# Patient Record
Sex: FEMALE | Race: WHITE | NOT HISPANIC OR LATINO | Employment: FULL TIME | RURAL
[De-identification: names, ages, dates, MRNs, and addresses within clinical notes are randomized per-mention and may not be internally consistent; named-entity substitution may affect disease eponyms.]

---

## 2020-07-21 ENCOUNTER — OFFICE VISIT (OUTPATIENT)
Dept: OTOLARYNGOLOGY | Facility: CLINIC | Age: 35
End: 2020-07-21

## 2020-07-21 VITALS
TEMPERATURE: 98.3 F | BODY MASS INDEX: 41.28 KG/M2 | WEIGHT: 263 LBS | DIASTOLIC BLOOD PRESSURE: 82 MMHG | SYSTOLIC BLOOD PRESSURE: 124 MMHG | OXYGEN SATURATION: 99 % | HEIGHT: 67 IN

## 2020-07-21 DIAGNOSIS — R42 VERTIGO: ICD-10-CM

## 2020-07-21 DIAGNOSIS — J34.2 NASAL SEPTAL DEFORMITY: ICD-10-CM

## 2020-07-21 DIAGNOSIS — G47.9 SLEEP DISTURBANCE: Primary | ICD-10-CM

## 2020-07-21 PROCEDURE — 99203 OFFICE O/P NEW LOW 30 MIN: CPT | Performed by: OTOLARYNGOLOGY

## 2020-07-21 RX ORDER — CLOTRIMAZOLE AND BETAMETHASONE DIPROPIONATE 10; .64 MG/G; MG/G
CREAM TOPICAL
COMMUNITY

## 2020-07-21 RX ORDER — CELECOXIB 200 MG/1
CAPSULE ORAL EVERY 12 HOURS SCHEDULED
COMMUNITY

## 2020-07-21 RX ORDER — ALBUTEROL SULFATE 90 UG/1
AEROSOL, METERED RESPIRATORY (INHALATION)
COMMUNITY

## 2020-07-22 NOTE — PROGRESS NOTES
Subjective   Ihsan Delgado is a 34 y.o. female.     History of Present Illness   Patient reports multiple complaints including episodic vertigo.  She states that she had vertigo back in November 2019 that subsided but then recurred a little over a week and a half ago.  She says when she has this it is a sensation of being moved or pushed to the side.  It is not spinning.  Not associated with any subjective change in hearing.  She is asymptomatic today and has been for several days.  She also reports that she snores loudly on a nightly basis to the point that her  complains about this.  She feels like she has nonrestorative sleep.  Reportedly had a sleep study about 7 years ago that did not show sleep apnea.  Did have allergy symptoms including congestion and postnasal drainage but reports these improved significantly after taking allergy shots so she is not on any regular medicine for her nose.  No purulent rhinorrhea.        The following portions of the patient's history were reviewed and updated as appropriate: allergies, current medications, past family history, past medical history, past social history, past surgical history and problem list.      Ihsan Delgado reports that she has never smoked. She has never used smokeless tobacco. Alcohol use questions deferred to the physician. Drug use questions deferred to the physician.  Patient is not a tobacco user and has not been counseled for use of tobacco products    Family History   Problem Relation Age of Onset   • Diabetes Mother    • Heart failure Father    • Heart failure Maternal Grandmother    • Heart failure Maternal Grandfather    • Heart failure Paternal Grandmother    • Heart failure Paternal Grandfather        Allergies   Allergen Reactions   • Azithromycin Dizziness         Current Outpatient Medications:   •  albuterol sulfate HFA (ProAir HFA) 108 (90 Base) MCG/ACT inhaler, ProAir HFA 90 mcg/actuation aerosol inhaler  INHALE 2 PUFFS PO Q 4 H PRN  COUGHING/WHEEZING, Disp: , Rfl:   •  celecoxib (CeleBREX) 200 MG capsule, Every 12 (Twelve) Hours., Disp: , Rfl:   •  clotrimazole-betamethasone (LOTRISONE) 1-0.05 % cream, clotrimazole-betamethasone 1 %-0.05 % topical cream, Disp: , Rfl:     Past Medical History:   Diagnosis Date   • Chronic headaches    • Heart murmur    • MRSA cellulitis     MRSA in Neck       Past Surgical History:   Procedure Laterality Date   •  SECTION     • KNEE ACL RECONSTRUCTION     • KNEE SURGERY      screw removal         Review of Systems   HENT: Positive for congestion and postnasal drip.    Endocrine: Positive for cold intolerance.   Neurological: Positive for dizziness and headaches.   Psychiatric/Behavioral:        Not assessed   All other systems reviewed and are negative.          Objective   Physical Exam  General: Well-developed well-nourished female in no acute distress.  Alert and oriented x-3. Head: Normocephalic. Face: Symmetrical strength and appearance. PERRL. EOMI. Voice:Strong. Speech:Fluent  Ears: External ears no deformity, canals no discharge, tympanic membranes intact clear and mobile bilaterally.  Nose: Nares show no discharge mass polyp or purulence.  Boggy mucosa is present.  No gross external deformity.  Septum: To the left obstructing at least 80% of the airflow through the left naris.  Oral cavity: Lips and gums without lesions.  Tongue and floor of mouth without lesions.  Parotid and submandibular ducts unobstructed.  No mucosal lesions on the buccal mucosa or vestibule of the mouth.  Pharynx: No erythema exudate mass or ulcer.  2+ tonsils.  Neck: No lymphadenopathy.  No thyromegaly.  Trachea and larynx midline.  No masses in the parotid or submandibular glands.  [place]-Hallpike maneuvers produced no vertigo and no nystagmus    Assessment/Plan   Ihsan was seen today for nasal congestion.    Diagnoses and all orders for this visit:    Sleep disturbance  -     Polysomnography 4 or More  Parameters; Future    Nasal septal deformity    Vertigo      And: I explained to the patient that surgical treatment just directed at snoring is frequently unsuccessful.  I think before considering any kind of nasal or pharyngeal surgery she should have a repeat sleep study because if she does have obstructive sleep apnea syndrome CPAP would be indicated and that would address the issue with snoring.  She is agreeable and this will be scheduled.  Also explained that with her being asymptomatic and having a normal ear exam I could not diagnose her vertigo but encouraged her to call right away the next time she had a flareup and I would try to see her while symptomatic.  Otherwise I will see her after her sleep study.

## 2020-08-24 DIAGNOSIS — G47.9 SLEEP DISTURBANCE: Primary | ICD-10-CM

## 2020-08-31 ENCOUNTER — APPOINTMENT (OUTPATIENT)
Dept: SLEEP MEDICINE | Facility: HOSPITAL | Age: 35
End: 2020-08-31

## 2020-09-24 ENCOUNTER — APPOINTMENT (OUTPATIENT)
Dept: SLEEP MEDICINE | Facility: HOSPITAL | Age: 35
End: 2020-09-24

## 2020-10-27 ENCOUNTER — HOSPITAL ENCOUNTER (OUTPATIENT)
Dept: SLEEP MEDICINE | Facility: HOSPITAL | Age: 35
Discharge: HOME | End: 2020-10-27
Admitting: OTOLARYNGOLOGY

## 2020-10-27 DIAGNOSIS — G47.9 SLEEP DISTURBANCE: ICD-10-CM

## 2020-10-27 PROCEDURE — 95806 SLEEP STUDY UNATT&RESP EFFT: CPT

## 2020-10-27 PROCEDURE — 95806 SLEEP STUDY UNATT&RESP EFFT: CPT | Performed by: PSYCHIATRY & NEUROLOGY

## 2020-11-17 ENCOUNTER — OFFICE VISIT (OUTPATIENT)
Dept: OTOLARYNGOLOGY | Facility: CLINIC | Age: 35
End: 2020-11-17

## 2020-11-17 VITALS — HEIGHT: 67 IN | BODY MASS INDEX: 42.06 KG/M2 | OXYGEN SATURATION: 97 % | TEMPERATURE: 97.7 F | WEIGHT: 268 LBS

## 2020-11-17 DIAGNOSIS — G47.9 SLEEP DISTURBANCE: Primary | ICD-10-CM

## 2020-11-17 DIAGNOSIS — J34.2 NASAL SEPTAL DEFORMITY: ICD-10-CM

## 2020-11-17 DIAGNOSIS — G47.33 OBSTRUCTIVE SLEEP APNEA: ICD-10-CM

## 2020-11-17 PROCEDURE — 99213 OFFICE O/P EST LOW 20 MIN: CPT | Performed by: OTOLARYNGOLOGY

## 2020-11-17 RX ORDER — IBUPROFEN 800 MG/1
800 TABLET ORAL
COMMUNITY

## 2020-11-17 RX ORDER — LORATADINE 10 MG/1
10 TABLET ORAL DAILY
COMMUNITY

## 2020-11-19 NOTE — PROGRESS NOTES
Subjective   Ihsan Delgado is a 35 y.o. female.       History of Present Illness   Patient was seen previously with snoring, sleep disturbance, and nonrestorative sleep.  Also had a nasal septal deformity.  Has episodic vertigo that is atypical in presentation.  Reports that she had another episode of vertigo earlier this month but it resolved spontaneously.  Continues to have snoring and nonrestorative sleep.  Has undergone a sleep study which did show mild obstructive sleep apnea.      The following portions of the patient's history were reviewed and updated as appropriate: allergies, current medications, past family history, past medical history, past social history, past surgical history and problem list.     reports that she has never smoked. She has never used smokeless tobacco. Alcohol use questions deferred to the physician. Drug use questions deferred to the physician.   Patient is not a tobacco user and has not been counseled for use of tobacco products      Review of Systems   Constitutional: Negative for fever.           Objective   Physical Exam  General: Well-developed well-nourished female in no acute distress.  Alert and oriented x-3.  Voice:Strong. Speech:Fluent  Ears: External ears no deformity, canals no discharge, tympanic membranes intact clear and mobile bilaterally.  Nose: Nares show no discharge mass polyp or purulence.  Boggy mucosa is present.  No gross external deformity.  Septum: To the left  Oral cavity: Lips and gums without lesions.  Tongue and floor of mouth without lesions.  Parotid and submandibular ducts unobstructed.  No mucosal lesions on the buccal mucosa or vestibule of the mouth.  Pharynx: No erythema exudate mass or ulcer.  2+ tonsils present.  Neck: No lymphadenopathy.  No thyromegaly.  Trachea and larynx midline.  No masses in the parotid or submandibular glands.      Assessment/Plan   Diagnoses and all orders for this visit:    1. Sleep disturbance (Primary)  -      Ambulatory Referral to Sleep Medicine    2. Obstructive sleep apnea    3. Nasal septal deformity        Plan: Told the patient that I would recommend a trial of positive airway pressure.  If successful this should alleviate her snoring and improve her nonrestorative sleep.  Would reserve surgery for failure of Pap therapy.  She is referred to the sleep lab for CPAP fitting.  I want her to see me about 3 months after she has been using her CPAP.

## 2020-12-01 ENCOUNTER — DOCUMENTATION (OUTPATIENT)
Dept: OTOLARYNGOLOGY | Facility: CLINIC | Age: 35
End: 2020-12-01

## 2020-12-02 DIAGNOSIS — G47.9 SLEEP DISTURBANCE: Primary | ICD-10-CM

## 2021-02-08 ENCOUNTER — OFFICE VISIT (OUTPATIENT)
Dept: SLEEP MEDICINE | Facility: HOSPITAL | Age: 36
End: 2021-02-08

## 2021-02-08 VITALS
HEIGHT: 67 IN | WEIGHT: 253 LBS | OXYGEN SATURATION: 98 % | HEART RATE: 68 BPM | DIASTOLIC BLOOD PRESSURE: 74 MMHG | BODY MASS INDEX: 39.71 KG/M2 | SYSTOLIC BLOOD PRESSURE: 114 MMHG

## 2021-02-08 DIAGNOSIS — G47.33 OBSTRUCTIVE SLEEP APNEA, ADULT: Primary | ICD-10-CM

## 2021-02-08 PROCEDURE — 99203 OFFICE O/P NEW LOW 30 MIN: CPT | Performed by: NURSE PRACTITIONER

## 2021-02-08 NOTE — PROGRESS NOTES
New Patient Sleep Medicine Consultation    Encounter Date: 2021         Patient's Primary Care Provider: Porsha Calderon MD  Referring Provider: Ramiro Le*  Reason for consultation/chief complaint: KATE recently diagnosed prior to visit with sleep clinic, [facility]    Ihsan Delgado is a 35 y.o. female who admits to snoring, unrestful sleep, decreased libido, excessive daytime sleepiness, Disturbed or restless sleep, Up to the bathroom at night, sleepy driving, restless legs at night, difficulty falling asleep and difficulty staying asleep.     She denies cataplexy, sleep paralysis, or hypnagogic hallucinations. Her bedtime is ~ 2230. She  falls asleep after 30 minutes, and is up 1-2 times per night. She wakes up ~ 0630. She endorses 6-8 hours of sleep. She drinks 1 cups of coffee, 0-1 decaf teas, and 0 sodas per day. She drinks 0 alcoholic beverages per week. She is not a current smoker. She does not take sedatives or hypnotics. She has occasional sleepiness with driving. She rarely naps.    Patient states that she had an HST ordered per ENT, who she is following for chronic nasal congestion with nasal septal deformity. Patient states that she is potentially trying to avoid a tonsillectomy or any other surgery.    [place] - 14    Prior Sleep Testin. HST on 10/27/2020, AHI of 5.3       Past Medical History:   Diagnosis Date   • Chronic headaches    • Heart murmur    • MRSA cellulitis     MRSA in Neck     Social History     Socioeconomic History   • Marital status:      Spouse name: Not on file   • Number of children: Not on file   • Years of education: Not on file   • Highest education level: Not on file   Tobacco Use   • Smoking status: Never Smoker   • Smokeless tobacco: Never Used   Substance and Sexual Activity   • Alcohol use: Defer   • Drug use: Defer     Family History   Problem Relation Age of Onset   • Diabetes Mother    • Heart failure Father    • Heart  "failure Maternal Grandmother    • Heart failure Maternal Grandfather    • Heart failure Paternal Grandmother    • Heart failure Paternal Grandfather      Prior T&A, UPPP, maxillofacial, or bariatric surgery: None  Family history of sleep disorders: Father-in-law has KATE on CPAP  Other family history + for: as above  Occupation: [employer]  Marital status:   Children: 3  Has 1 brothers and 2 half sisters  Smoking history: smoked never      Review of Systems:  Constitutional: positive for fatigue  Eyes: negative  Ears, nose, mouth, throat, and face: positive for hoarseness, nasal congestion, snoring, sore throat and voice change  Respiratory: positive for reactive airway disease  Cardiovascular: positive for low blood pressure  Gastrointestinal: negative  Genitourinary:negative  Integument/breast: negative  Hematologic/lymphatic: negative  Musculoskeletal:negative  Neurological: negative  Behavioral/Psych: positive for fatigue and sleep disturbance  Endocrine: negative  Allergic/Immunologic: negative   Patient advised to discuss any positive ROS with PCP.      Vitals:    02/08/21 1636   BP: 114/74   Pulse: 68   SpO2: 98%           02/08/21  1636   Weight: 115 kg (253 lb)       Body mass index is 39.62 kg/m². Patient's Body mass index is 39.62 kg/m². BMI is above normal parameters. Recommendations include: referral to primary care.      Physical Exam:        General: Alert. Cooperative. Well developed. No acute distress.   Head/Neck:  Normocephalic. Symmetrical. Atraumatic.     Neck circumference: 14.5\"             Eyes: Sclera clear. No icterus. PERRLA. Normal EOM.             Ears: No deformities. Normal hearing.             Nose: Mild septal deviation. No drainage.          Throat: No oral lesions. No thrush. Moist mucous membranes. Trachea midline    Tongue is normal     Dentition is good       Pharynx: Posterior pharyngeal pillars are narrow    Mallampati score of II (hard and soft palate, " upper portion of tonsils anduvula visible)    Pharynx is nonerythematous, with both tonsils mildly enlarged   Chest Wall:  Normal shape. Symmetric expansion with respiration. No tenderness.          Lungs:  Clear to auscultation bilaterally. No wheezes. No rhonchi. No rales. Respirations regular, even and unlabored.            Heart:  Regular rhythm and normal rate. Normal S1 and S2. No murmur.     Abdomen:  Soft, non-tender and non-distended. Normal bowel sounds. No masses.  Extremities:  Moves all extremities well. No edema.           Pulses: Pulses palpable and equal bilaterally.               Skin: Dry. Intact. No bleeding. No rash.           Neuro: Moves all 4 extremities and cranial nerves grossly intact.  Psychiatric: Normal mood and affect.      Current Outpatient Medications:   •  albuterol sulfate HFA (ProAir HFA) 108 (90 Base) MCG/ACT inhaler, ProAir HFA 90 mcg/actuation aerosol inhaler  INHALE 2 PUFFS PO Q 4 H PRN COUGHING/WHEEZING, Disp: , Rfl:   •  celecoxib (CeleBREX) 200 MG capsule, Every 12 (Twelve) Hours., Disp: , Rfl:   •  clotrimazole-betamethasone (LOTRISONE) 1-0.05 % cream, clotrimazole-betamethasone 1 %-0.05 % topical cream, Disp: , Rfl:   •  ibuprofen (ADVIL,MOTRIN) 800 MG tablet, Take 800 mg by mouth., Disp: , Rfl:   •  loratadine (CLARITIN) 10 MG tablet, Take 10 mg by mouth Daily., Disp: , Rfl:     No results found for: WBC, RBC, HGB, HCT, MCV, MCH, MCHC, RDW, RDWSD, MPV, PLT, NEUTRORELPCT, LYMPHORELPCT, MONORELPCT, EOSRELPCT, BASORELPCT, AUTOIGPER, NEUTROABS, LYMPHSABS, MONOSABS, EOSABS, BASOSABS, AUTOIGNUM, NRBC, IRON, FERRITIN  No results found for: GLUCOSE, BUN, CREATININE, EGFRIFNONA, EGFRIFAFRI, BCR, K, CO2, CALCIUM, PROTENTOTREF, ALBUMIN, LABIL2, BILIRUBIN, AST, ALT      ASSESSMENT:  1. Obstructive sleep apnea - New (to me), no additional work-up planned (3)  1. Script for [facility] 5-15 cm H2O and supplies  2. Discussed PAP therapy at length  3. Follow up within 30-90 days with  compliance report, or sooner if changes in symptoms or problems with PAP therapy  2. Nasal septal deformity - Established, stable (1)  1. Following ENT      I spent 30 minutes caring for Ihsan on this date of service. This time includes time spent by me in the following activities: preparing for the visit, reviewing tests, obtaining and/or reviewing a separately obtained history, performing a medically appropriate examination and/or evaluation , counseling and educating the patient/family/caregiver, ordering medications, tests, or procedures, documenting information in the medical record and independently interpreting results and communicating that information with the patient/family/caregiver; discussing PAP therapy, PAP compliance and PAP maintenance    Patient to follow up in 31-90 days with compliance report. Patient agrees to return sooner if changes in symptoms.           This document has been electronically signed by FAISAL Brown on February 8, 2021 17:11 CST          CC: Porsha Calderon MD Logan, William Anderson*

## 2021-03-16 ENCOUNTER — OFFICE VISIT (OUTPATIENT)
Dept: OTOLARYNGOLOGY | Facility: CLINIC | Age: 36
End: 2021-03-16

## 2021-03-16 VITALS
DIASTOLIC BLOOD PRESSURE: 78 MMHG | HEIGHT: 67 IN | WEIGHT: 256 LBS | SYSTOLIC BLOOD PRESSURE: 120 MMHG | BODY MASS INDEX: 40.18 KG/M2

## 2021-03-16 DIAGNOSIS — H81.11 BPPV (BENIGN PAROXYSMAL POSITIONAL VERTIGO), RIGHT: Primary | ICD-10-CM

## 2021-03-16 DIAGNOSIS — G47.33 OBSTRUCTIVE SLEEP APNEA: ICD-10-CM

## 2021-03-16 DIAGNOSIS — J34.2 NASAL SEPTAL DEFORMITY: ICD-10-CM

## 2021-03-16 PROCEDURE — 99214 OFFICE O/P EST MOD 30 MIN: CPT | Performed by: OTOLARYNGOLOGY

## 2021-03-19 NOTE — PROGRESS NOTES
"Subjective   Ihsan Delgado is a 35 y.o. female.       History of Present Illness     Patient had been seen previously with sleep disturbance and has been diagnosed with sleep apnea and is using a CPAP machine.  Is having some trouble because of nasal obstruction but is working with a new mask.  Has a nasal septal deformity.  She comes in today because she is having dizziness.  She had previously had intermittent dizziness but I had not been able to make a diagnosis because she was not symptomatic when I saw her.  She says right now when she lies down or bends over she feels like she is being \"pushed\".    The following portions of the patient's history were reviewed and updated as appropriate: allergies, current medications, past family history, past medical history, past social history, past surgical history and problem list.     reports that she has never smoked. She has never used smokeless tobacco. Alcohol use questions deferred to the physician. Drug use questions deferred to the physician.   Patient is not a tobacco user and has not been counseled for use of tobacco products      Review of Systems        Objective   Physical Exam  Ears: External ears no deformity, canals no discharge, tympanic membranes intact clear and mobile bilaterally.  Nares: Boggy mucosa septum to the left  Oral cavity: Lips and gums without lesions.  Tongue and floor of mouth without lesions.  Parotid and submandibular ducts unobstructed.  No mucosal lesions on the buccal mucosa or vestibule of the mouth.  Pharynx: 2+ tonsils, no erythema or exudate  Neck: No lymphadenopathy.  No thyromegaly.  Trachea and larynx midline.  No masses in the parotid or submandibular glands.  David-Hallpike maneuvers produce a classic latent rotatory nystagmus and subjective spinning vertigo with head to the right.    Assessment/Plan   Diagnoses and all orders for this visit:    1. BPPV (benign paroxysmal positional vertigo), right (Primary)    2. Nasal septal " deformity    3. Obstructive sleep apnea      Plan: Explained BPPV to the patient.  Explained the compensation process.  Advised [place] exercises morning and evening.  Return in 6 weeks.  By then hopefully she will know whether or not she is able to tolerate CPAP as well.

## 2021-04-28 ENCOUNTER — OFFICE VISIT (OUTPATIENT)
Dept: SLEEP MEDICINE | Facility: HOSPITAL | Age: 36
End: 2021-04-28

## 2021-04-28 DIAGNOSIS — G47.33 OBSTRUCTIVE SLEEP APNEA, ADULT: Primary | ICD-10-CM

## 2021-04-28 DIAGNOSIS — J34.2 NASAL SEPTAL DEFORMITY: ICD-10-CM

## 2021-04-28 PROCEDURE — 99442 PR PHYS/QHP TELEPHONE EVALUATION 11-20 MIN: CPT | Performed by: NURSE PRACTITIONER

## 2021-04-28 NOTE — PROGRESS NOTES
Sleep Clinic Follow Up - Telephone Visit      You have chosen to receive care through a telephone visit. Do you consent to use a telephone visit for your medical care today? Yes    Date: 2021  Primary Care Provider: Porsha Calderon MD    Last office visit: 2021 (I reviewed this note)    CC: Follow up: KATE started on CPAP      Interim History:  Since the last visit:    1) mild KATE -  Ihsan Delgado has mostly remained compliant with CPAP. She denies machine issues, dry mouth, headaches, or pressures intolerance. She denies abnormal dreams, sleep paralysis, URI sx. She initially had issues with proper mask fit and has recently switched to a different full face mask and likes it much better. She states that she cannot breathe through her nose when she lies down at night due to chronic nasal congestion. She is unable to use a chin strap due to this because she has to breathe through her mouth at night. She has been following with ENT and has another upcoming appointment regarding this as well as for nasal septal deformity. She reports subconsciously taking her mask off during the night most nights. Since starting PAP therapy, she does not yet notice any changes in symptoms, such as less daytime sleepiness or more refreshing sleep.    2) Patient denies RLS symptoms.     Sleep Testin. HST on 10/27/2020, AHI of 5.3   2. Currently on 5-15 cm H2O    PAP Data:    Time frame: 2021-2021   Compliance: 100 %  Average use on days used: 4 hrs 20 min  Percent of days with usage greater than or equal to 4 hours: 43%  PAP range : 5-15 cm H2O  Average 90% pressure: 9.4 cmH2O  Leak: 4.4 L/minute  Average AHI: 0.3 events/hr  Mask type: Full face mask  DME: [facility]    Bed time: 2230  Sleep latency: 30 minutes  Number of times awakens during the night: 1-2  Wake time: 0630  Estimated total sleep time at night: 6-8 hours  Caffeine intake: 1 cups of coffee, 0-1 cups of decaf tea, and 0 sodas per  day  Alcohol intake: 0 drinks per week  Nap time: rare   Sleepiness with Driving: rare     [place] - 14    PMHx, FH, SH reviewed and pertinent changes are: Reportedly unchanged from last office visit      REVIEW OF SYSTEMS:   Negative for chest pain, SOA, fever, chills, cough, N/V/D, abdominal pain.    Smoking:none    Ihsan Delgado  reports that she has never smoked. She has never used smokeless tobacco.      Exam:  Unable to perform physical exam due to conducting telephone visit    Patient's Body mass index is 40.1 kg/m². BMI is above normal parameters. Recommendations include: referral to primary care.        Past Medical History:   Diagnosis Date   • Chronic headaches    • Heart murmur 2006   • MRSA cellulitis 2007    MRSA in Neck       Current Outpatient Medications:   •  albuterol sulfate HFA (ProAir HFA) 108 (90 Base) MCG/ACT inhaler, ProAir HFA 90 mcg/actuation aerosol inhaler  INHALE 2 PUFFS PO Q 4 H PRN COUGHING/WHEEZING, Disp: , Rfl:   •  celecoxib (CeleBREX) 200 MG capsule, Every 12 (Twelve) Hours., Disp: , Rfl:   •  clotrimazole-betamethasone (LOTRISONE) 1-0.05 % cream, clotrimazole-betamethasone 1 %-0.05 % topical cream, Disp: , Rfl:   •  ibuprofen (ADVIL,MOTRIN) 800 MG tablet, Take 800 mg by mouth., Disp: , Rfl:   •  loratadine (CLARITIN) 10 MG tablet, Take 10 mg by mouth Daily., Disp: , Rfl:   No results found for: WBC, RBC, HGB, HCT, MCV, MCH, MCHC, RDW, RDWSD, MPV, PLT, NEUTRORELPCT, LYMPHORELPCT, MONORELPCT, EOSRELPCT, BASORELPCT, AUTOIGPER, NEUTROABS, LYMPHSABS, MONOSABS, EOSABS, BASOSABS, AUTOIGNUM, NRBC    No results found for: GLUCOSE, BUN, CREATININE, EGFRIFNONA, EGFRIFAFRI, BCR, K, CO2, CALCIUM, PROTENTOTREF, ALBUMIN, LABIL2, BILIRUBIN, AST, ALT      Assessment and Plan:    1. Obstructive sleep apnea - Established, stable (1)  1. Mostly compliant with PAP therapy - encouraged increased hours of usage overall  2. Encouraged continued desensitization to PAP therapy  3. Continue PAP as  prescribed - adjust pressure to 5-10 cm H2O for now for comfort  4. Return to clinic in 1 month with compliance report unless change in symptoms in interim period  2. Nasal septal deformity - Established, stable (1)   1. Continue following with ENT      This visit has been rescheduled as a phone visit to comply with patient safety concerns in accordance with CDC recommendations. Total time of discussion was 18 minutes.    I spent 22 minutes caring for Ihsan on this date of service. This time includes time spent by me in the following activities: preparing for the visit, reviewing tests, obtaining and/or reviewing a separately obtained history, counseling and educating the patient/family/caregiver and documenting information in the medical record; discussing PAP therapy, PAP compliance and PAP maintenance      RTC in 1 month. Patient agrees to return sooner if changes in symptoms.      This document has been electronically signed by FAISAL Brown on April 29, 2021 08:42 CDT          CC: Porsha Caldeorn MD          No ref. provider found

## 2021-04-29 VITALS — WEIGHT: 256 LBS | BODY MASS INDEX: 40.18 KG/M2 | HEIGHT: 67 IN

## 2021-04-29 PROBLEM — J34.2 NASAL SEPTAL DEFORMITY: Status: ACTIVE | Noted: 2021-04-29

## 2021-11-30 ENCOUNTER — TELEMEDICINE (OUTPATIENT)
Dept: SLEEP MEDICINE | Facility: HOSPITAL | Age: 36
End: 2021-11-30

## 2021-11-30 DIAGNOSIS — G47.33 OBSTRUCTIVE SLEEP APNEA, ADULT: Primary | ICD-10-CM

## 2021-11-30 DIAGNOSIS — G47.19 EXCESSIVE DAYTIME SLEEPINESS: ICD-10-CM

## 2021-11-30 DIAGNOSIS — J34.2 NASAL SEPTAL DEFORMITY: ICD-10-CM

## 2021-11-30 PROCEDURE — 99214 OFFICE O/P EST MOD 30 MIN: CPT | Performed by: NURSE PRACTITIONER

## 2021-11-30 RX ORDER — FERROUS SULFATE 325(65) MG
325 TABLET ORAL
COMMUNITY

## 2021-11-30 RX ORDER — GLYBURIDE 5 MG/1
5 TABLET ORAL
COMMUNITY

## 2021-11-30 RX ORDER — ERGOCALCIFEROL 1.25 MG/1
50000 CAPSULE ORAL WEEKLY
COMMUNITY

## 2021-11-30 RX ORDER — SERTRALINE HYDROCHLORIDE 25 MG/1
25 TABLET, FILM COATED ORAL DAILY
Qty: 30 TABLET | Refills: 0 | Status: SHIPPED | OUTPATIENT
Start: 2021-11-30

## 2021-11-30 NOTE — PROGRESS NOTES
[facility]      You have chosen to receive care through a video visit. Do you consent to use a video visit for your medical care today? Yes  Location of patient: [place] ([place])    Date: 11/30/2021  Primary Care Provider: Porsha Calderon MD    Last office visit: 04/28/2021 (telephone visit) (I reviewed this note)    CC: Follow up: KATE on CPAP      Interim History:  Since the last visit:    1) mild KATE -  Ihsan Delgado has remained compliant with CPAP. She denies mask and machine issues, dry mouth, headaches, or pressures intolerance. She denies abnormal dreams, sleep paralysis, nasal congestion, URI sx. She is getting more used to PAP therapy now and has not been having any issues. She rarely still notices that she is taking off her mask at night without realizing it. She is significantly struggling with daytime sleepiness.    2) Patient denies RLS symptoms.     3) Excessive daytime sleepiness - Patient continues to struggle with daytime sleepiness despite adequate CPAP usage and appropriate AHI. She states that she has had a full fatigue workup by her PCP and she has been taking iron and vitamin D off and on for over 1 year. She is currently taking both. She states that her thyroid levels have been normal as well. She finds herself wanting to nap every day and naps whenever she can. When she does take naps, she has to sleep for about 3 hours at at time, and still wants to sleep all night. She is getting adequate sleep at night but still wakes up unrefreshed.  We discussed possible hypersomnia workup, and patient is not interested in further workup at this time due to financial concerns as well as concerns regarding stimulant-type medications. She states that she is very sensitive to side effects of most mediations. We discussed other options, including treatment of daytime sleepiness with anti-depressants, and patient wishes to pursue this option at this time.     Sleep  Testin. HST on 10/27/2020, AHI of 5.3   2. Currently on 5-15 cm H2O    PAP Data:    Time frame: 2021-2021   Compliance: 98 %  Average use on days used: 7 hrs 35 min  Percent of days with usage greater than or equal to 4 hours: 83%  PAP range : 5-15 cm H2O  Average 90% pressure: 6.9 cmH2O  Leak: 0 L/minute  Average AHI: 0.3 events/hr  Mask type: Full face mask  DME: [facility]    Bed time: 2230  Sleep latency: 30 minutes  Number of times awakens during the night: 1-2  Wake time: 0630  Estimated total sleep time at night: 6-8 hours  Caffeine intake: 1-2 cups of coffee, 0-1 cups of tea, and 0 sodas per day  Alcohol intake: 0 drinks per week  Nap time: occasional, long naps   Sleepiness with Driving: rare     [place] - 14    PMHx, FH, SH reviewed and pertinent changes are: Started glyburide for borderline diabetes.      REVIEW OF SYSTEMS:   Negative for chest pain, SOA, fever, chills, cough, N/V/D, abdominal pain.    Smoking:none    Ihsan Delgado  reports that she has never smoked. She has never used smokeless tobacco..      Exam:  Unable to perform full physical exam due to conducting video visit.    Patient's There is no height or weight on file to calculate BMI.      Gen:                No distress, conversant, pleasant, appears stated age, alert, oriented  Eyes:               EOMI   Heent:             NC/AT, normal hearing  Lungs:             Normal  effort, non-labored breathing              Psych:             Appropriate affect      Past Medical History:   Diagnosis Date   • Chronic headaches    • Heart murmur    • MRSA cellulitis 2007    MRSA in Neck       Current Outpatient Medications:   •  albuterol sulfate HFA (ProAir HFA) 108 (90 Base) MCG/ACT inhaler, ProAir HFA 90 mcg/actuation aerosol inhaler  INHALE 2 PUFFS PO Q 4 H PRN COUGHING/WHEEZING, Disp: , Rfl:   •  celecoxib (CeleBREX) 200 MG capsule, Every 12 (Twelve) Hours., Disp: , Rfl:   •  clotrimazole-betamethasone  (LOTRISONE) 1-0.05 % cream, clotrimazole-betamethasone 1 %-0.05 % topical cream, Disp: , Rfl:   •  ibuprofen (ADVIL,MOTRIN) 800 MG tablet, Take 800 mg by mouth., Disp: , Rfl:   •  loratadine (CLARITIN) 10 MG tablet, Take 10 mg by mouth Daily., Disp: , Rfl:   No results found for: WBC, RBC, HGB, HCT, MCV, MCH, MCHC, RDW, RDWSD, MPV, PLT, NEUTRORELPCT, LYMPHORELPCT, MONORELPCT, EOSRELPCT, BASORELPCT, AUTOIGPER, NEUTROABS, LYMPHSABS, MONOSABS, EOSABS, BASOSABS, AUTOIGNUM, NRBC    No results found for: GLUCOSE, BUN, CREATININE, EGFRIFNONA, EGFRIFAFRI, BCR, K, CO2, CALCIUM, PROTENTOTREF, ALBUMIN, LABIL2, BILIRUBIN, AST, ALT      Assessment and Plan:    1. Obstructive sleep apnea - Established, stable (1)  1. Compliant with PAP therapy  2. Continue PAP as prescribed (pressure previously unchanged to 5-10 cm H2O as ordered, but no recent pressure intolerance, so can leave at 5-15 cm H2O)  2. Nasal septal deformity - Established, stable (1)  3. Excessive daytime sleepiness - Established, worsening (2)   1. Discussed actigraphy monitoring, sleep diaries, PSG with CPAP, and next day MSLT - patient denies wanting further workup at this time  2. Discussed potential medication options - patient wishes to try antidepressant  3. Script for Zoloft 25 mg daily - discussed medication safety and side effects  4. Follow up in 1 month, or sooner if changes in symptoms in the interim period    This visit has been rescheduled as a video visit to comply with patient safety concerns in accordance with CDC recommendations. Total time of discussion was 34 minutes.    I spent 45 minutes caring for Ihsan on this date of service. This time includes time spent by me in the following activities: preparing for the visit, reviewing tests, obtaining and/or reviewing a separately obtained history, counseling and educating the patient/family/caregiver, ordering medications, tests, or procedures and documenting information in the medical record;  discussing PAP therapy, PAP compliance, PAP maintenance, Medication changes and Further testing    RTC in 1 month. Patient agrees to return sooner if changes in symptoms.          This document has been electronically signed by FAISAL Hawkins on November 30, 2021 11:11 CST          CC: Porsha Calderon MD          No ref. provider found

## 2022-03-02 DIAGNOSIS — G47.33 OBSTRUCTIVE SLEEP APNEA, ADULT: Primary | ICD-10-CM

## 2022-06-08 ENCOUNTER — DOCUMENTATION (OUTPATIENT)
Dept: NUTRITION | Facility: HOSPITAL | Age: 37
End: 2022-06-08

## 2022-06-08 NOTE — PROGRESS NOTES
Nutrition Services    Patient Name:  Ihsan Delgado  YOB: 1985  MRN: [id number]  Admit Date:  (Not on file)    Pt called for appt for GI issues and IBS. Faxed referral form to dr. carter roca today.     Electronically signed by:  Cris Anthony RD  06/08/22 10:26 CDT

## 2022-06-09 ENCOUNTER — TRANSCRIBE ORDERS (OUTPATIENT)
Dept: NUTRITION | Facility: HOSPITAL | Age: 37
End: 2022-06-09

## 2022-06-09 DIAGNOSIS — K58.9 IRRITABLE BOWEL SYNDROME, UNSPECIFIED TYPE: Primary | ICD-10-CM

## 2022-06-27 ENCOUNTER — HOSPITAL ENCOUNTER (OUTPATIENT)
Dept: NUTRITION | Facility: HOSPITAL | Age: 37
Discharge: HOME | End: 2022-06-27
Admitting: DIETITIAN, REGISTERED

## 2022-06-27 PROCEDURE — 97802 MEDICAL NUTRITION INDIV IN: CPT | Performed by: DIETITIAN, REGISTERED

## 2022-06-30 NOTE — PROGRESS NOTES
Adult Outpatient Nutrition  Assessment    Patient Name:  Ihsan Delgado  YOB: 1985  MRN: [id number]    Assessment Date:  6/30/2022    Comments:  Pt was referred by dr. carter gonsales. Pt called for appt and referral was sent by MD. Pt has gained 20 lb in 1 yr and wants to learn how to lose weight. She has tried all diets without success. She has been active and ate well to lose weight but at this time she is not as active with work and college classes she is taking-which she will be finished with in the near future. She dislikes breakfast and eats a protein shake for breakfast then eats lunch and supper trying to avoid carbs in the diet. The family has a garden, plans meals daily to eat at 6 pm after she arrives home at 5 pm. Her 3 children all like different foods and she even adds pureed vegetables to foods to sneak in vegetables for those in the family who refuse vegetables any other way. She snacks after supper sometimes but limits snacks. Reviewed carb counting of 45 grams per meal. Gave a sample menu and many handouts on breakfast, snacks, etc. Pt thought this plan may work for her. She will return for follow up in 1 month. She has my phone number if questions arise.     General Info     Row Name 06/30/22 9631       Today's Session    Person(s) attending today's session Patient       General Information    How Well Do You Speak English? very well    People in Home child(stephenie), dependent;spouse               Physical Findings     Row Name 06/30/22 2760          Physical Findings    Overall Physical Appearance obese                  Retired Nutritional Info/Activity     Row Name 06/30/22 1819          Eating Environment    Eating environment Family            Physical Activity    Are you currently involved in an activity/exercise program?  No     Reasons for Inactivity Other (comment)  working and in school at this time.- limited time.                Home Nutrition Report     Row Name 06/30/22 2550           Home Nutrition Report    Typical Intake (Food/Fluid/EN/PN) may skip breakfast then eat two meals                Estimated/Assessed Needs - Anthropometrics     Row Name 06/30/22 1624          Estimated/Assessed Needs    Additional Documentation Estimated Calorie Needs (Group)            Estimated Calorie Needs    Estimated Calorie Requirement (kcal/day) 1600  for weight mgt                  Labs/Tests/Procedures/Meds     Row Name 06/30/22 1624          Labs/Procedures/Meds    Lab Results Reviewed reviewed, pertinent            Medications    Pertinent Medications Reviewed reviewed, pertinent                   Electronically signed by:  Cris Anthony RD  06/30/22 16:26 CDT

## 2022-08-04 ENCOUNTER — HOSPITAL ENCOUNTER (OUTPATIENT)
Dept: NUTRITION | Facility: HOSPITAL | Age: 37
Discharge: HOME | End: 2022-08-04
Admitting: DIETITIAN, REGISTERED

## 2022-08-04 PROCEDURE — 97803 MED NUTRITION INDIV SUBSEQ: CPT | Performed by: DIETITIAN, REGISTERED

## 2022-08-04 NOTE — PROGRESS NOTES
Adult Outpatient Nutrition  Assessment    Patient Name:  Ihsan Delgado  YOB: 1985  MRN: [id number]    Assessment Date:  8/4/2022    Comments:  Pt returned for a follow up visit. She was referred by Dr. Porsha Oates for irritable bowel syndrome. Pt had a setback in that a tooth had to be pulled and she was unable to follow the meal plan or exercise most the time since the first visit. Pt plans to join the gym and exercise there with her . She continues to include her children in food pref since they all have different food preferences. They avoid MSG and make food homemade at home. She is more interested in feeling better, her clothes fitting better, bending over easier than actually a weight on the scale. Pt is very supportive of her daughter who came in for nutrition counseling as well. A follow up appt has been made in one month for evaluation and continued education.     General Info     Row Name 08/04/22 1713       Today's Session    Person(s) attending today's session Patient       General Information    People in Home spouse;child(stephenie), dependent               Physical Findings     Row Name 08/04/22 1713          Physical Findings    Overall Physical Appearance obese                  Retired Nutritional Info/Activity     Row Name 08/04/22 1714          Eating Environment    Eating environment Family            Physical Activity    Are you currently involved in an activity/exercise program?  No     Reasons for Inactivity Other (comment)  pt's next goal is to join the gym and attend with her .                Home Nutrition Report     Row Name 08/04/22 1715          Home Nutrition Report    Typical Intake (Food/Fluid/EN/PN) pt had a tooth pulled and resultant pain and was unable to go to the gym or follow the meal plan most of the time since the last visit.                    Labs/Tests/Procedures/Meds     Row Name 08/04/22 1711          Labs/Procedures/Meds    Lab Results Comments  no new labs                   Electronically signed by:  Cris Anthony RD  08/04/22 17:17 CDT

## 2023-06-01 ENCOUNTER — TELEMEDICINE (OUTPATIENT)
Dept: SLEEP MEDICINE | Facility: HOSPITAL | Age: 38
End: 2023-06-01

## 2023-06-01 ENCOUNTER — TELEPHONE (OUTPATIENT)
Dept: SLEEP MEDICINE | Facility: HOSPITAL | Age: 38
End: 2023-06-01

## 2023-06-01 VITALS — WEIGHT: 260 LBS | HEIGHT: 67 IN | BODY MASS INDEX: 40.81 KG/M2

## 2023-06-01 DIAGNOSIS — G47.33 OBSTRUCTIVE SLEEP APNEA: Primary | ICD-10-CM

## 2023-06-01 DIAGNOSIS — J34.2 NASAL SEPTAL DEFORMITY: ICD-10-CM

## 2023-06-01 NOTE — TELEPHONE ENCOUNTER
----- Message from FAISAL Hawkins sent at 6/1/2023  9:00 AM CDT -----  [facility] - [place]  Dr. Ross    Phone #: [phone]  [facility] [place] [address]

## 2023-06-01 NOTE — PROGRESS NOTES
[facility]      You have chosen to receive care through a video visit. Do you consent to use a video visit for your medical care today? Yes  Location of patient: Work    Date: 2023  Primary Care Provider: Porsha Calderon MD    Last office visit: 2021 (video visit) (I reviewed this note)    CC: Follow up: KATE on CPAP      Interim History:  Since the last visit:    1) mild KATE -  Ihsan Delgado has remained compliant with CPAP. She denies mask and machine issues, dry mouth, headaches, or pressures intolerance. She denies abnormal dreams, sleep paralysis, nasal congestion, URI sx. She still has very significant snoring even with consistent use of her CPAP. I will increase her pressure slightly to attempt to eliminate more snoring. She has had chronic issues with nasal septal deformity and would like a second ENT opinion. I will refer her to someone in [place] for this.      Sleep Testin. HST on 10/27/2020, AHI of 5.3   2. Currently on 5-15 cm H2O    PAP Data:    Time frame: 2022-2023   Compliance: 100 %  Average use on days used: 8hrs 13 min  Percent of days with usage greater than or equal to 4 hours: 98%  PAP range : 5-15 cm H2O  Average 90% pressure: 10.8 cmH2O  Leak: 0 L/minute  Average AHI: 0.2 events/hr  Mask type: Full face mask  DME: [facility]  Machine type: ResMed AirSense 10    (taken from previous visit)  Bed time: 2230  Sleep latency: 30 minutes  Number of times awakens during the night: 1-2  Wake time: 0630  Estimated total sleep time at night: 6-8 hours  Caffeine intake: 1-2 cups of coffee, 0-1 cups of tea, and 0 sodas per day  Alcohol intake: 0 drinks per week  Nap time: occasional   Sleepiness with Driving: none/rare       PMHx, FH, SH reviewed and pertinent changes are: Medication changes. Otherwisde unchanged from last office visit      REVIEW OF SYSTEMS:   Negative for chest pain, SOA, fever, chills, cough, N/V/D,  abdominal pain.    Smoking:none    Ihsan Delgado  reports that she has never smoked. She has never used smokeless tobacco.      Exam:  Unable to perform full physical exam due to conducting video visit.        06/01/23  0844   Weight: 118 kg (260 lb)     Body mass index is 40.72 kg/m².  Class 3 Severe Obesity (BMI >=40). Obesity-related health conditions include the following: obstructive sleep apnea. Obesity is unchanged. BMI is is above average; BMI management plan is completed. I recommend portion control and increasing exercise.      Gen:                No distress, conversant, pleasant, appears stated age, alert, oriented  Eyes:               EOMI   Heent:             NC/AT, normal hearing  Lungs:             Normal  effort, non-labored breathing              Psych:             Appropriate affect      Past Medical History:   Diagnosis Date   • Chronic headaches    • Heart murmur 2006   • MRSA cellulitis 2007    MRSA in [place]ck       Current Outpatient Medications:   •  albuterol sulfate HFA (ProAir HFA) 108 (90 Base) MCG/ACT inhaler, ProAir HFA 90 mcg/actuation aerosol inhaler  INHALE 2 PUFFS PO Q 4 H PRN COUGHING/WHEEZING, Disp: , Rfl:   •  clotrimazole-betamethasone (LOTRISONE) 1-0.05 % cream, clotrimazole-betamethasone 1 %-0.05 % topical cream, Disp: , Rfl:   •  ferrous sulfate 325 (65 FE) MG tablet, Take 325 mg by mouth Daily With Breakfast., Disp: , Rfl:   •  ibuprofen (ADVIL,MOTRIN) 800 MG tablet, Take 800 mg by mouth., Disp: , Rfl:   •  vitamin D (ERGOCALCIFEROL) 1.25 MG (96387 UT) capsule capsule, Take 50,000 Units by mouth 1 (One) Time Per Week., Disp: , Rfl:   No results found for: WBC, RBC, HGB, HCT, MCV, MCH, MCHC, RDW, RDWSD, MPV, PLT, NEUTRORELPCT, LYMPHORELPCT, MONORELPCT, EOSRELPCT, BASORELPCT, AUTOIGPER, NEUTROABS, LYMPHSABS, MONOSABS, EOSABS, BASOSABS, AUTOIGNUM, NRBC    No results found for: GLUCOSE, BUN, CREATININE, EGFRRESULT, EGFR, BCR, K, CO2, CALCIUM, PROTENTOTREF, ALBUMIN, BILITOT, AST,  ALT      Assessment and Plan:    1. Obstructive sleep apnea - Established, stable (1)  1. Compliant with PAP therapy  2. Continue PAP as prescribed - increase pressure to 8-15 cm H2O, increase ramp pressure for snoring  3. Script for PAP supplies  4. Pertinent labs were reviewed as listed above  5. Return to clinic in 1 year with compliance report unless change in symptoms in interim period  2. Nasal septal deformity - Established, not controlled  1. Referral to ENT ([place]) - will locate provider and make patient aware, they will call for appointment  3. Morbid obesity - BMI 40.7 - stable chronic illness    This visit has been rescheduled as a video visit to comply with patient safety concerns in accordance with CDC recommendations. Total time of discussion was 15 minutes.    I spent 20 minutes caring for Ihsan on this date of service. This time includes time spent by me in the following activities: preparing for the visit, reviewing tests, obtaining and/or reviewing a separately obtained history, counseling and educating the patient/family/caregiver, referring and communicating with other health care professionals , documenting information in the medical record and care coordination; discussing PAP therapy, PAP compliance and PAP maintenance    RTC in 12 months. Patient agrees to return sooner if changes in symptoms.            This document has been electronically signed by FAISAL Hawkins on June 1, 2023 08:49 CDT          CC: Porsha Calderon MD          No ref. provider found

## 2023-06-01 NOTE — TELEPHONE ENCOUNTER
Informed patient where we would send referral, gave Providers name, phone number, and address. Patient voiced understanding. Will fax referral to Dr. Ross in [place] at Fax [phone]